# Patient Record
Sex: MALE | Race: WHITE | Employment: FULL TIME | URBAN - METROPOLITAN AREA
[De-identification: names, ages, dates, MRNs, and addresses within clinical notes are randomized per-mention and may not be internally consistent; named-entity substitution may affect disease eponyms.]

---

## 2018-11-15 ENCOUNTER — HOSPITAL ENCOUNTER (OUTPATIENT)
Age: 53
Discharge: HOME | End: 2018-11-15

## 2018-11-15 LAB — RUBV IGG SER QL: 185.8 IU/ML

## 2018-11-15 PROCEDURE — 86735 MUMPS ANTIBODY: CPT

## 2018-11-15 PROCEDURE — 86787 VARICELLA-ZOSTER ANTIBODY: CPT

## 2018-11-15 PROCEDURE — 86765 RUBEOLA ANTIBODY: CPT

## 2018-11-15 PROCEDURE — 86762 RUBELLA ANTIBODY: CPT

## 2018-11-15 PROCEDURE — 86480 TB TEST CELL IMMUN MEASURE: CPT

## 2018-11-16 LAB
MEASLES IMMUNE (IGG): 7.91
MUV IGG SER QL: 8.04
VZV IGG SER QL IA: 0.79

## 2018-11-17 LAB
QUANTI TB GOLD PLUS: NEGATIVE
QUANTI TB1 MINUS NIL: 0 IU/ML (ref 0–0.34)
QUANTI TB2 MINUS NIL: 0 IU/ML (ref 0–0.34)
QUANTIFERON MITOGEN: >10 IU/ML
QUANTIFERON NIL: 0.02 IU/ML

## 2019-05-23 ENCOUNTER — HOSPITAL ENCOUNTER (OUTPATIENT)
Age: 54
Setting detail: SPECIMEN
Discharge: HOME | End: 2019-05-23
Payer: COMMERCIAL

## 2019-05-23 ENCOUNTER — OFFICE VISIT (OUTPATIENT)
Dept: PRIMARY CARE CLINIC | Age: 54
End: 2019-05-23
Payer: COMMERCIAL

## 2019-05-23 VITALS
OXYGEN SATURATION: 98 % | SYSTOLIC BLOOD PRESSURE: 130 MMHG | WEIGHT: 148.2 LBS | DIASTOLIC BLOOD PRESSURE: 74 MMHG | HEART RATE: 50 BPM | HEIGHT: 68 IN | BODY MASS INDEX: 22.46 KG/M2

## 2019-05-23 DIAGNOSIS — Z13.220 ENCOUNTER FOR LIPID SCREENING FOR CARDIOVASCULAR DISEASE: ICD-10-CM

## 2019-05-23 DIAGNOSIS — Z13.1 ENCOUNTER FOR SCREENING EXAMINATION FOR IMPAIRED GLUCOSE REGULATION AND DIABETES MELLITUS: ICD-10-CM

## 2019-05-23 DIAGNOSIS — Z13.6 ENCOUNTER FOR LIPID SCREENING FOR CARDIOVASCULAR DISEASE: ICD-10-CM

## 2019-05-23 DIAGNOSIS — Z23 NEED FOR VIRAL IMMUNIZATION: ICD-10-CM

## 2019-05-23 DIAGNOSIS — Z00.00 ANNUAL PHYSICAL EXAM: Primary | ICD-10-CM

## 2019-05-23 DIAGNOSIS — Z00.00 ANNUAL PHYSICAL EXAM: ICD-10-CM

## 2019-05-23 DIAGNOSIS — Z12.5 SCREENING PSA (PROSTATE SPECIFIC ANTIGEN): ICD-10-CM

## 2019-05-23 LAB
CHOLESTEROL, FASTING: 164 MG/DL
CHOLESTEROL/HDL RATIO: 3.2
GLUCOSE FASTING: 84 MG/DL (ref 70–99)
HDLC SERPL-MCNC: 51 MG/DL
HEPATITIS C ANTIBODY: NONREACTIVE
LDL CHOLESTEROL: 103 MG/DL (ref 0–130)
PROSTATE SPECIFIC ANTIGEN: 0.5 UG/L
TRIGLYCERIDE, FASTING: 51 MG/DL
VLDLC SERPL CALC-MCNC: NORMAL MG/DL (ref 1–30)

## 2019-05-23 PROCEDURE — 90471 IMMUNIZATION ADMIN: CPT | Performed by: FAMILY MEDICINE

## 2019-05-23 PROCEDURE — 99396 PREV VISIT EST AGE 40-64: CPT | Performed by: FAMILY MEDICINE

## 2019-05-23 PROCEDURE — 90715 TDAP VACCINE 7 YRS/> IM: CPT | Performed by: FAMILY MEDICINE

## 2019-05-23 SDOH — HEALTH STABILITY: MENTAL HEALTH: HOW OFTEN DO YOU HAVE A DRINK CONTAINING ALCOHOL?: NEVER

## 2019-05-23 ASSESSMENT — PATIENT HEALTH QUESTIONNAIRE - PHQ9
SUM OF ALL RESPONSES TO PHQ9 QUESTIONS 1 & 2: 0
SUM OF ALL RESPONSES TO PHQ QUESTIONS 1-9: 0
2. FEELING DOWN, DEPRESSED OR HOPELESS: 0
1. LITTLE INTEREST OR PLEASURE IN DOING THINGS: 0
SUM OF ALL RESPONSES TO PHQ QUESTIONS 1-9: 0

## 2019-05-23 ASSESSMENT — ENCOUNTER SYMPTOMS
SHORTNESS OF BREATH: 0
SORE THROAT: 0
EYE DISCHARGE: 0
DIARRHEA: 0
VOMITING: 0
EYE REDNESS: 0
COUGH: 0
RHINORRHEA: 0
NAUSEA: 0
WHEEZING: 0
ABDOMINAL PAIN: 0

## 2019-05-23 NOTE — PROGRESS NOTES
[address] [facility] PRIMARY CARE  [address] [address] [facility]  Dept: [phone]    Mila Stallworth is a 48 y.o. male who presents today for his medical conditions/complaintsas noted below. Chief Complaint   Patient presents with    Annual Exam     Be well within       HPI:     HPI   Pt here for annual exam.  Has some knee pain. No chest pain or sob. No fever or chills. No light headed. No chronic meds. No change in family history. Weight stable. LDL Calculated (mg/dL)   Date Value   04/27/2017 97       (goal LDL is <100)   No results found for: AST, ALT, BUN  BP Readings from Last 3 Encounters:   05/23/19 130/74   04/21/17 138/78          (goal 120/80)    History reviewed. No pertinent past medical history. History reviewed. No pertinent surgical history. History reviewed. No pertinent family history. Social History     Tobacco Use    Smoking status: Never Smoker    Smokeless tobacco: Never Used   Substance Use Topics    Alcohol use: Never     Frequency: Never      No current outpatient medications on file. No current facility-administered medications for this visit. No Known Allergies    Health Maintenance   Topic Date Due    Hepatitis C screen  1965    HIV screen  12/05/1980    DTaP/Tdap/Td vaccine (1 - Tdap) 12/05/1984    Shingles Vaccine (1 of 2) 12/05/2015    Lipid screen  04/27/2022    Colon cancer screen colonoscopy  06/06/2027    Flu vaccine  Completed    Pneumococcal 0-64 years Vaccine  Aged Out       Subjective:      Review of Systems   Constitutional: Negative for chills and fever. HENT: Negative for rhinorrhea and sore throat. Eyes: Negative for discharge and redness. Respiratory: Negative for cough, shortness of breath and wheezing. Cardiovascular: Negative for chest pain and palpitations. Gastrointestinal: Negative for abdominal pain, diarrhea, nausea and vomiting.    Genitourinary: Negative for dysuria and frequency. Musculoskeletal: Negative for arthralgias and myalgias. Neurological: Negative for dizziness, light-headedness and headaches. Psychiatric/Behavioral: Negative for sleep disturbance. Objective:     /74   Pulse 50   Ht 5' 8.04\" (1.728 m)   Wt 148 lb 3.2 oz (67.2 kg)   SpO2 98%   BMI 22.51 kg/m²   Physical Exam   Constitutional: He is oriented to person, place, and time. He appears well-developed and well-nourished. No distress. HENT:   Head: Normocephalic and atraumatic. Mouth/Throat: Oropharynx is clear and moist.   Eyes: Pupils are equal, round, and reactive to light. Conjunctivae are normal. Right eye exhibits no discharge. Left eye exhibits no discharge. No scleral icterus. Neck: No tracheal deviation present. No thyromegaly present. Cardiovascular: Normal rate, regular rhythm and normal heart sounds. No carotid bruits   Pulmonary/Chest: Effort normal and breath sounds normal. No respiratory distress. He has no wheezes. Musculoskeletal: He exhibits no edema. Lymphadenopathy:     He has no cervical adenopathy. Neurological: He is alert and oriented to person, place, and time. Skin: Skin is warm. No rash noted. Psychiatric: He has a normal mood and affect. His behavior is normal. Thought content normal.   Nursing note and vitals reviewed. Assessment:       Diagnosis Orders   1. Annual physical exam  Hepatitis C Antibody   2. Need for viral immunization  Tdap (age 10y-63y) IM (ADACEL)   3. Encounter for lipid screening for cardiovascular disease  Lipid, Fasting   4. Encounter for screening examination for impaired glucose regulation and diabetes mellitus  Glucose, Fasting   5. Screening PSA (prostate specific antigen)  PSA Screening        Plan:     Adacel today  Labs ordered    Return in about 1 year (around 5/23/2020) for PHYSICAL EXAM.    Orders Placed This Encounter   Procedures    Tdap (age 10y-63y) IM (ADACEL)    PSA Screening     Standing Status: Future     Standing Expiration Date:   5/23/2020    Hepatitis C Antibody     Standing Status:   Future     Standing Expiration Date:   5/23/2020    Lipid, Fasting     Standing Status:   Future     Standing Expiration Date:   5/23/2020    Glucose, Fasting     Standing Status:   Future     Standing Expiration Date:   5/23/2020     No orders of the defined types were placed in this encounter. Patient given educationalmaterials - see patient instructions. Discussed use, benefit, and side effectsof prescribed medications. All patient questions answered. Pt voiced understanding. Reviewed health maintenance. Instructed to continue current medications, diet andexercise. Patient agreed with treatment plan. Follow up as directed.      Electronicallysigned by Brooke Flores MD on 5/23/2019 at 2:27 PM

## 2020-05-15 ENCOUNTER — OFFICE VISIT (OUTPATIENT)
Dept: PRIMARY CARE CLINIC | Age: 55
End: 2020-05-15

## 2020-05-15 ENCOUNTER — HOSPITAL ENCOUNTER (OUTPATIENT)
Age: 55
Setting detail: SPECIMEN
Discharge: HOME | End: 2020-05-15
Payer: COMMERCIAL

## 2020-05-18 LAB — SARS-COV-2, NAA: NOT DETECTED

## 2020-05-19 ENCOUNTER — TELEPHONE (OUTPATIENT)
Dept: PRIMARY CARE CLINIC | Age: 55
End: 2020-05-19

## 2020-05-20 ENCOUNTER — OFFICE VISIT (OUTPATIENT)
Dept: PRIMARY CARE CLINIC | Age: 55
End: 2020-05-20

## 2020-05-20 ENCOUNTER — HOSPITAL ENCOUNTER (OUTPATIENT)
Age: 55
Setting detail: SPECIMEN
Discharge: HOME | End: 2020-05-20
Payer: COMMERCIAL

## 2020-05-20 NOTE — PROGRESS NOTES
Patient presents for COVID-19 testing. Order accessible in [address] [place] [address] Rd. Patient swabbed in the office. Patient tolerated well.      Electronically signed by MICHAEL Viramontes CNP on 5/20/2020 at 11:00 AM

## 2020-05-23 LAB — SARS-COV-2, NAA: NOT DETECTED

## 2020-11-11 ENCOUNTER — HOSPITAL ENCOUNTER (OUTPATIENT)
Age: 55
Discharge: HOME | End: 2020-11-11
Payer: COMMERCIAL

## 2020-11-11 PROCEDURE — U0003 INFECTIOUS AGENT DETECTION BY NUCLEIC ACID (DNA OR RNA); SEVERE ACUTE RESPIRATORY SYNDROME CORONAVIRUS 2 (SARS-COV-2) (CORONAVIRUS DISEASE [COVID-19]), AMPLIFIED PROBE TECHNIQUE, MAKING USE OF HIGH THROUGHPUT TECHNOLOGIES AS DESCRIBED BY CMS-2020-01-R: HCPCS

## 2020-11-13 LAB — SARS-COV-2, NAA: DETECTED

## 2020-11-20 ENCOUNTER — TELEMEDICINE (OUTPATIENT)
Dept: PRIMARY CARE CLINIC | Age: 55
End: 2020-11-20
Payer: COMMERCIAL

## 2020-11-20 PROCEDURE — 3017F COLORECTAL CA SCREEN DOC REV: CPT | Performed by: PHYSICIAN ASSISTANT

## 2020-11-20 PROCEDURE — 99213 OFFICE O/P EST LOW 20 MIN: CPT | Performed by: PHYSICIAN ASSISTANT

## 2020-11-20 PROCEDURE — G8427 DOCREV CUR MEDS BY ELIG CLIN: HCPCS | Performed by: PHYSICIAN ASSISTANT

## 2020-11-20 RX ORDER — BENZONATATE 100 MG/1
100 CAPSULE ORAL 3 TIMES DAILY PRN
Qty: 30 CAPSULE | Refills: 0 | Status: SHIPPED | OUTPATIENT
Start: 2020-11-20 | End: 2020-11-30

## 2020-11-20 ASSESSMENT — ENCOUNTER SYMPTOMS
CONSTIPATION: 0
VOMITING: 0
CHEST TIGHTNESS: 0
DIARRHEA: 0
SORE THROAT: 0
ABDOMINAL PAIN: 0
SINUS PRESSURE: 0
COUGH: 1
RHINORRHEA: 0
SHORTNESS OF BREATH: 0
ABDOMINAL DISTENTION: 0

## 2020-11-20 NOTE — PROGRESS NOTES
[address] [facility] CARE  [address] [address]  [address] [address]  Dept: Marie Arnoldo Decker is a 47 y.o. male who presents today for his medical conditions/complaintsas noted below. Chief Complaint   Patient presents with    Other     RTW visit       HPI:     HPI: The patient is here for a work note to return to work. Has lingering cough. No fevers in last 24 hours. No fever lowering medication. The his symptoms stated on the ninth. He had a cough, He has fatigue and felt run down but that is better. He would like to return Monday. He is using OTC robitussin. He has also seen the occupational michael Nurse practitioner. LDL Cholesterol (mg/dL)   Date Value   05/23/2019 103     LDL Calculated (mg/dL)   Date Value   04/27/2017 97       (goal LDL is <100)   No results found for: AST, ALT, BUN  BP Readings from Last 3 Encounters:   05/23/19 130/74   04/21/17 138/78          (goal 120/80)    No past medical history on file. No past surgical history on file. No family history on file. Social History     Tobacco Use    Smoking status: Never Smoker    Smokeless tobacco: Never Used   Substance Use Topics    Alcohol use: Never     Frequency: Never      No current outpatient medications on file. No current facility-administered medications for this visit. No Known Allergies    Health Maintenance   Topic Date Due    HIV screen  12/05/1980    Shingles Vaccine (1 of 2) 12/05/2015    Flu vaccine (1) 09/01/2020    Lipid screen  05/23/2024    Colon cancer screen colonoscopy  06/06/2027    DTaP/Tdap/Td vaccine (3 - Td) 05/23/2029    Hepatitis C screen  Completed    Hepatitis A vaccine  Aged Out    Hepatitis B vaccine  Aged Out    Hib vaccine  Aged Out    Meningococcal (ACWY) vaccine  Aged Out    Pneumococcal 0-64 years Vaccine  Aged Out       Subjective:      Review of Systems   Constitutional: Negative for chills, fatigue and fever.    HENT: Negative for congestion, hearing loss, rhinorrhea, sinus pressure and sore throat. Respiratory: Positive for cough. Negative for chest tightness and shortness of breath. Gastrointestinal: Negative for abdominal distention, abdominal pain, constipation, diarrhea and vomiting. Musculoskeletal: Negative for arthralgias, gait problem and myalgias. Skin: Negative for rash. Neurological: Positive for headaches (at night ). Psychiatric/Behavioral: Negative for dysphoric mood and sleep disturbance. The patient is not nervous/anxious. Objective: There were no vitals taken for this visit. Physical Exam  Constitutional:       General: He is not in acute distress. Appearance: Normal appearance. He is not ill-appearing. HENT:      Head: Normocephalic and atraumatic. Right Ear: External ear normal.      Left Ear: External ear normal.      Nose: Nose normal.      Mouth/Throat:      Mouth: Mucous membranes are moist.   Pulmonary:      Effort: Pulmonary effort is normal. No respiratory distress. Comments: cough  Musculoskeletal: Normal range of motion. Neurological:      Mental Status: He is alert and oriented to person, place, and time. Psychiatric:         Mood and Affect: Mood normal.         Behavior: Behavior normal.         Thought Content: Thought content normal.         Assessment:       Diagnosis Orders   1. Cough          Plan:       Tessalon perrles ordered   He may return to work without restrictions on Monday. No other concerns   Call office if symptoms worsen or fever returns before work    Hodan Cruz is a 47 y.o. male being evaluated by a Virtual Visit (video visit) encounter to address concerns as mentioned above. A caregiver was present when appropriate. Due to this being a TeleHealth encounter (During [place] [address] public health emergency), evaluation of the following organ systems was limited: Vitals/Constitutional/EENT/Resp/CV/GI//MS/Neuro/Skin/Heme-Lymph-Imm. Pursuant to the emergency declaration under the [facility], [facility] authority and the [employer] Act, this Virtual Visit was conducted with patient's (and/or legal guardian's) consent, to reduce the patient's risk of exposure to COVID-19 and provide necessary medical care. The patient (and/or legal guardian) has also been advised to contact this office for worsening conditions or problems, and seek emergency medical treatment and/or call 911 if deemed necessary. Patient identification was verified at the start of the visit: Yes    Total time spent for this encounter: 15 min    Services were provided through a video synchronous discussion virtually to substitute for in-person clinic visit. Patient and provider were located at their individual homes. --LADAN Alvarenga on 11/20/2020 at 7:40 AM    An electronic signature was used to authenticate this note. Return for Follow up if symptoms persist or worsen. No orders of the defined types were placed in this encounter. No orders of the defined types were placed in this encounter. Patient given educationalmaterials - see patient instructions. Discussed use, benefit, and side effectsof prescribed medications. All patient questions answered. Pt voiced understanding. Reviewed health maintenance. Instructed to continue current medications, diet andexercise. Patient agreed with treatment plan. Follow up as directed.      Electronicallysigned by LADAN Alvarenga on 11/20/2020 at 7:39 AM

## 2021-07-30 ENCOUNTER — OFFICE VISIT (OUTPATIENT)
Dept: PRIMARY CARE CLINIC | Age: 56
End: 2021-07-30
Payer: COMMERCIAL

## 2021-07-30 VITALS
OXYGEN SATURATION: 99 % | SYSTOLIC BLOOD PRESSURE: 124 MMHG | HEIGHT: 68 IN | WEIGHT: 144.7 LBS | BODY MASS INDEX: 21.93 KG/M2 | DIASTOLIC BLOOD PRESSURE: 72 MMHG | HEART RATE: 47 BPM

## 2021-07-30 DIAGNOSIS — R53.83 FATIGUE, UNSPECIFIED TYPE: ICD-10-CM

## 2021-07-30 DIAGNOSIS — Z00.00 ANNUAL PHYSICAL EXAM: Primary | ICD-10-CM

## 2021-07-30 DIAGNOSIS — Z12.5 SCREENING PSA (PROSTATE SPECIFIC ANTIGEN): ICD-10-CM

## 2021-07-30 DIAGNOSIS — Z23 NEED FOR VIRAL IMMUNIZATION: ICD-10-CM

## 2021-07-30 DIAGNOSIS — Z13.6 ENCOUNTER FOR LIPID SCREENING FOR CARDIOVASCULAR DISEASE: ICD-10-CM

## 2021-07-30 DIAGNOSIS — Z13.220 ENCOUNTER FOR LIPID SCREENING FOR CARDIOVASCULAR DISEASE: ICD-10-CM

## 2021-07-30 PROCEDURE — 99396 PREV VISIT EST AGE 40-64: CPT | Performed by: FAMILY MEDICINE

## 2021-07-30 PROCEDURE — 90471 IMMUNIZATION ADMIN: CPT | Performed by: FAMILY MEDICINE

## 2021-07-30 PROCEDURE — 90750 HZV VACC RECOMBINANT IM: CPT | Performed by: FAMILY MEDICINE

## 2021-07-30 SDOH — ECONOMIC STABILITY: FOOD INSECURITY: WITHIN THE PAST 12 MONTHS, YOU WORRIED THAT YOUR FOOD WOULD RUN OUT BEFORE YOU GOT MONEY TO BUY MORE.: NEVER TRUE

## 2021-07-30 SDOH — ECONOMIC STABILITY: FOOD INSECURITY: WITHIN THE PAST 12 MONTHS, THE FOOD YOU BOUGHT JUST DIDN'T LAST AND YOU DIDN'T HAVE MONEY TO GET MORE.: NEVER TRUE

## 2021-07-30 ASSESSMENT — ENCOUNTER SYMPTOMS
COUGH: 0
EYE REDNESS: 0
WHEEZING: 0
RHINORRHEA: 0
DIARRHEA: 0
NAUSEA: 0
VOMITING: 0
SHORTNESS OF BREATH: 0
ABDOMINAL PAIN: 0
SORE THROAT: 0
EYE DISCHARGE: 0

## 2021-07-30 ASSESSMENT — PATIENT HEALTH QUESTIONNAIRE - PHQ9
SUM OF ALL RESPONSES TO PHQ9 QUESTIONS 1 & 2: 0
SUM OF ALL RESPONSES TO PHQ QUESTIONS 1-9: 0
2. FEELING DOWN, DEPRESSED OR HOPELESS: 0
SUM OF ALL RESPONSES TO PHQ QUESTIONS 1-9: 0
SUM OF ALL RESPONSES TO PHQ QUESTIONS 1-9: 0
1. LITTLE INTEREST OR PLEASURE IN DOING THINGS: 0

## 2021-07-30 ASSESSMENT — SOCIAL DETERMINANTS OF HEALTH (SDOH): HOW HARD IS IT FOR YOU TO PAY FOR THE VERY BASICS LIKE FOOD, HOUSING, MEDICAL CARE, AND HEATING?: NOT HARD AT ALL

## 2021-07-30 NOTE — PROGRESS NOTES
[address] [address] PRIMARY CARE  [address] [address] [place] [address]  Dept: [phone]    Greig Jeans is a 54 y.o. male Established patient, who presents today for his medical conditions/complaints as noted below. Chief Complaint   Patient presents with    Annual Exam     Be well within       HPI:     HPI  Patient here for well checkup. Denies any chest pain or shortness of breath. No lightheadedness or dizziness. States last time he went to give blood told he was anemic and could not. Patient still running 3 to 10 miles a day. Denies any urinary symptoms. No melena hematochezia. Otherwise unremarkable. Reviewed prior notes None  Reviewed previous Labs    LDL Cholesterol (mg/dL)   Date Value   05/23/2019 103     LDL Calculated (mg/dL)   Date Value   04/27/2017 97       (goal LDL is <100)   No results found for: AST, ALT, BUN, LABA1C, TSH  BP Readings from Last 3 Encounters:   07/30/21 124/72   05/23/19 130/74   04/21/17 138/78          (goal 120/80)    No past medical history on file. No past surgical history on file. No family history on file. Social History     Tobacco Use    Smoking status: Never Smoker    Smokeless tobacco: Never Used   Substance Use Topics    Alcohol use: Never      No current outpatient medications on file. No current facility-administered medications for this visit.      No Known Allergies    Health Maintenance   Topic Date Due    HIV screen  Never done    Flu vaccine (1) 09/01/2021    Shingles Vaccine (2 of 2) 09/24/2021    Lipid screen  05/23/2024    Colon cancer screen colonoscopy  06/06/2027    DTaP/Tdap/Td vaccine (3 - Td or Tdap) 05/23/2029    COVID-19 Vaccine  Completed    Hepatitis C screen  Completed    Hepatitis A vaccine  Aged Out    Hepatitis B vaccine  Aged Out    Hib vaccine  Aged Out    Meningococcal (ACWY) vaccine  Aged Out    Pneumococcal 0-64 years Vaccine  Aged Out       Subjective: Diagnosis Orders   1. Annual physical exam  Basic Metabolic Panel, Fasting    Hepatic Function Panel    CBC With Auto Differential    TSH   2. Fatigue, unspecified type  CBC With Auto Differential    TSH    Ferritin   3. Encounter for lipid screening for cardiovascular disease  Lipid, Fasting   4. Screening PSA (prostate specific antigen)  PSA screening   5. Need for viral immunization  Zoster [facility])        Plan:    labs ordered  Start Shingrix series    Return in about 1 year (around 7/30/2022). Orders Placed This Encounter   Procedures    Zoster [facility])    Lipid, Fasting     Standing Status:   Future     Standing Expiration Date:   10/30/2021    Basic Metabolic Panel, Fasting     Standing Status:   Future     Standing Expiration Date:   10/30/2021    Hepatic Function Panel     Standing Status:   Future     Standing Expiration Date:   10/30/2021    PSA screening     Standing Status:   Future     Standing Expiration Date:   10/30/2021    CBC With Auto Differential     Standing Status:   Future     Standing Expiration Date:   7/30/2022    TSH     Standing Status:   Future     Standing Expiration Date:   7/30/2022    Ferritin     Standing Status:   Future     Standing Expiration Date:   7/30/2022     No orders of the defined types were placed in this encounter. Patient given educational materials - see patient instructions. Discussed use, benefit, and side effects of prescribed medications. All patient questions answered. Pt voiced understanding. Reviewed health maintenance. Instructed to continue current medications, diet andexercise. Patient agreed with treatment plan. Follow up as directed.      Electronicallysigned by Luba Summers MD on 7/30/2021 at 2:56 PM

## 2021-08-05 ENCOUNTER — HOSPITAL ENCOUNTER (OUTPATIENT)
Age: 56
Setting detail: SPECIMEN
Discharge: HOME | End: 2021-08-05
Payer: COMMERCIAL

## 2021-08-05 DIAGNOSIS — Z12.5 SCREENING PSA (PROSTATE SPECIFIC ANTIGEN): ICD-10-CM

## 2021-08-05 DIAGNOSIS — R53.83 FATIGUE, UNSPECIFIED TYPE: ICD-10-CM

## 2021-08-05 DIAGNOSIS — Z13.6 ENCOUNTER FOR LIPID SCREENING FOR CARDIOVASCULAR DISEASE: ICD-10-CM

## 2021-08-05 DIAGNOSIS — Z00.00 ANNUAL PHYSICAL EXAM: ICD-10-CM

## 2021-08-05 DIAGNOSIS — Z13.220 ENCOUNTER FOR LIPID SCREENING FOR CARDIOVASCULAR DISEASE: ICD-10-CM

## 2021-08-05 LAB
ABSOLUTE EOS #: 0.41 K/UL (ref 0–0.44)
ABSOLUTE IMMATURE GRANULOCYTE: <0.03 K/UL (ref 0–0.3)
ABSOLUTE LYMPH #: 1.24 K/UL (ref 1.1–3.7)
ABSOLUTE MONO #: 0.42 K/UL (ref 0.1–1.2)
ALBUMIN SERPL-MCNC: 4.3 G/DL (ref 3.5–5.2)
ALBUMIN/GLOBULIN RATIO: 1.7 (ref 1–2.5)
ALP BLD-CCNC: 73 U/L (ref 40–129)
ALT SERPL-CCNC: 14 U/L (ref 5–41)
ANION GAP SERPL CALCULATED.3IONS-SCNC: 9 MMOL/L (ref 9–17)
AST SERPL-CCNC: 21 U/L
BASOPHILS # BLD: 2 % (ref 0–2)
BASOPHILS ABSOLUTE: 0.07 K/UL (ref 0–0.2)
BILIRUB SERPL-MCNC: 0.6 MG/DL (ref 0.3–1.2)
BILIRUBIN DIRECT: 0.13 MG/DL
BILIRUBIN, INDIRECT: 0.47 MG/DL (ref 0–1)
BUN BLDV-MCNC: 16 MG/DL (ref 6–20)
BUN/CREAT BLD: NORMAL (ref 9–20)
CALCIUM SERPL-MCNC: 9.5 MG/DL (ref 8.6–10.4)
CHLORIDE BLD-SCNC: 103 MMOL/L (ref 98–107)
CHOLESTEROL, FASTING: 172 MG/DL
CHOLESTEROL/HDL RATIO: 3.8
CO2: 26 MMOL/L (ref 20–31)
CREAT SERPL-MCNC: 0.9 MG/DL (ref 0.7–1.2)
DIFFERENTIAL TYPE: ABNORMAL
EOSINOPHILS RELATIVE PERCENT: 9 % (ref 1–4)
FERRITIN: 13 UG/L (ref 30–400)
GFR AFRICAN AMERICAN: >60 ML/MIN
GFR NON-AFRICAN AMERICAN: >60 ML/MIN
GFR SERPL CREATININE-BSD FRML MDRD: NORMAL ML/MIN/{1.73_M2}
GFR SERPL CREATININE-BSD FRML MDRD: NORMAL ML/MIN/{1.73_M2}
GLOBULIN: NORMAL G/DL (ref 1.5–3.8)
GLUCOSE FASTING: 87 MG/DL (ref 70–99)
HCT VFR BLD CALC: 44.2 % (ref 40.7–50.3)
HDLC SERPL-MCNC: 45 MG/DL
HEMOGLOBIN: 12.9 G/DL (ref 13–17)
IMMATURE GRANULOCYTES: 0 %
LDL CHOLESTEROL: 112 MG/DL (ref 0–130)
LYMPHOCYTES # BLD: 27 % (ref 24–43)
MCH RBC QN AUTO: 22.1 PG (ref 25.2–33.5)
MCHC RBC AUTO-ENTMCNC: 29.2 G/DL (ref 28.4–34.8)
MCV RBC AUTO: 75.8 FL (ref 82.6–102.9)
MONOCYTES # BLD: 9 % (ref 3–12)
NRBC AUTOMATED: 0 PER 100 WBC
PDW BLD-RTO: 19.7 % (ref 11.8–14.4)
PLATELET # BLD: 194 K/UL (ref 138–453)
PLATELET ESTIMATE: ABNORMAL
PMV BLD AUTO: 10.8 FL (ref 8.1–13.5)
POTASSIUM SERPL-SCNC: 4.3 MMOL/L (ref 3.7–5.3)
PROSTATE SPECIFIC ANTIGEN: 0.5 UG/L
RBC # BLD: 5.83 M/UL (ref 4.21–5.77)
RBC # BLD: ABNORMAL 10*6/UL
SEG NEUTROPHILS: 53 % (ref 36–65)
SEGMENTED NEUTROPHILS ABSOLUTE COUNT: 2.49 K/UL (ref 1.5–8.1)
SODIUM BLD-SCNC: 138 MMOL/L (ref 135–144)
TOTAL PROTEIN: 6.8 G/DL (ref 6.4–8.3)
TRIGLYCERIDE, FASTING: 76 MG/DL
TSH SERPL DL<=0.05 MIU/L-ACNC: 1.99 MIU/L (ref 0.3–5)
VLDLC SERPL CALC-MCNC: NORMAL MG/DL (ref 1–30)
WBC # BLD: 4.7 K/UL (ref 3.5–11.3)
WBC # BLD: ABNORMAL 10*3/UL

## 2021-08-12 ENCOUNTER — TELEPHONE (OUTPATIENT)
Dept: PRIMARY CARE CLINIC | Age: 56
End: 2021-08-12

## 2021-08-12 NOTE — RESULT ENCOUNTER NOTE
Advise pt shows iron deficiency. Recommend starting ferrous sulfate 325mg daily  Please do stool hemoccult times two. Please give him two hemoccult cards.

## 2021-08-30 DIAGNOSIS — Z00.00 ANNUAL PHYSICAL EXAM: Primary | ICD-10-CM

## 2021-08-30 DIAGNOSIS — Z00.00 ANNUAL PHYSICAL EXAM: ICD-10-CM

## 2021-08-30 LAB
HEMOCCULT STL QL: NEGATIVE

## 2021-08-30 PROCEDURE — 82270 OCCULT BLOOD FECES: CPT | Performed by: FAMILY MEDICINE

## 2021-09-22 LAB
CHOLESTEROL/HDL RATIO: 3.8
CHOLESTEROL: 172 MG/DL
GLUCOSE BLD-MCNC: 87 MG/DL (ref 70–99)
HDLC SERPL-MCNC: 45 MG/DL
LDL CHOLESTEROL: 112 MG/DL (ref 0–130)
PATIENT FASTING?: NORMAL
TRIGL SERPL-MCNC: 76 MG/DL
VLDLC SERPL CALC-MCNC: NORMAL MG/DL (ref 1–30)

## 2021-11-12 ENCOUNTER — IMMUNIZATION (OUTPATIENT)
Dept: PRIMARY CARE CLINIC | Age: 56
End: 2021-11-12
Payer: COMMERCIAL

## 2021-11-12 DIAGNOSIS — Z23 NEED FOR VACCINATION: Primary | ICD-10-CM

## 2021-11-12 PROCEDURE — 91300 COVID-19, PFIZER VACCINE 30MCG/0.3ML DOSE: CPT | Performed by: PHYSICIAN ASSISTANT

## 2021-11-12 PROCEDURE — 0004A COVID-19, PFIZER VACCINE 30MCG/0.3ML DOSE: CPT | Performed by: PHYSICIAN ASSISTANT

## 2021-11-12 NOTE — PROGRESS NOTES
After obtaining consent, and per orders of Melissa Bailey, injection of covid booster given in Left deltoid by Elizabeth Stauffer MA. Patient instructed to remain in clinic for 15 minutes afterwards, and to report any adverse reaction to me immediately. Patient reported no reactions after 15 minutes of waiting and tolerated well.

## 2021-11-18 ENCOUNTER — NURSE ONLY (OUTPATIENT)
Dept: PRIMARY CARE CLINIC | Age: 56
End: 2021-11-18
Payer: COMMERCIAL

## 2021-11-18 DIAGNOSIS — Z23 NEED FOR VACCINATION: Primary | ICD-10-CM

## 2021-11-18 PROCEDURE — 90471 IMMUNIZATION ADMIN: CPT | Performed by: FAMILY MEDICINE

## 2021-11-18 PROCEDURE — 90750 HZV VACC RECOMBINANT IM: CPT | Performed by: FAMILY MEDICINE

## 2021-11-18 NOTE — PROGRESS NOTES
After obtaining consent, and per orders of Dr. Mark Madrigal, injection of shingrix given in Right deltoid by Wanda Patterson MA. Patient instructed to remain in clinic for 20 minutes afterwards, and to report any adverse reaction to me immediately.

## 2022-01-07 ENCOUNTER — HOSPITAL ENCOUNTER (OUTPATIENT)
Age: 57
Setting detail: SPECIMEN
Discharge: HOME | End: 2022-01-07

## 2022-01-07 DIAGNOSIS — R05.9 COUGH: ICD-10-CM

## 2022-01-07 DIAGNOSIS — R09.81 HEAD CONGESTION: ICD-10-CM

## 2022-01-07 DIAGNOSIS — R09.81 HEAD CONGESTION: Primary | ICD-10-CM

## 2022-01-08 LAB
SARS-COV-2: ABNORMAL
SARS-COV-2: DETECTED
SOURCE: ABNORMAL

## 2022-07-08 ENCOUNTER — OFFICE VISIT (OUTPATIENT)
Dept: PRIMARY CARE CLINIC | Age: 57
End: 2022-07-08
Payer: COMMERCIAL

## 2022-07-08 VITALS
DIASTOLIC BLOOD PRESSURE: 72 MMHG | BODY MASS INDEX: 22.68 KG/M2 | WEIGHT: 144.5 LBS | HEIGHT: 67 IN | HEART RATE: 50 BPM | SYSTOLIC BLOOD PRESSURE: 116 MMHG | OXYGEN SATURATION: 96 %

## 2022-07-08 DIAGNOSIS — Z13.6 ENCOUNTER FOR LIPID SCREENING FOR CARDIOVASCULAR DISEASE: ICD-10-CM

## 2022-07-08 DIAGNOSIS — Z13.220 ENCOUNTER FOR LIPID SCREENING FOR CARDIOVASCULAR DISEASE: ICD-10-CM

## 2022-07-08 DIAGNOSIS — Z00.00 ANNUAL PHYSICAL EXAM: Primary | ICD-10-CM

## 2022-07-08 DIAGNOSIS — Z12.5 SCREENING PSA (PROSTATE SPECIFIC ANTIGEN): ICD-10-CM

## 2022-07-08 DIAGNOSIS — Z00.00 ROUTINE GENERAL MEDICAL EXAMINATION AT A HEALTH CARE FACILITY: ICD-10-CM

## 2022-07-08 PROCEDURE — 99396 PREV VISIT EST AGE 40-64: CPT | Performed by: FAMILY MEDICINE

## 2022-07-08 ASSESSMENT — PATIENT HEALTH QUESTIONNAIRE - PHQ9
1. LITTLE INTEREST OR PLEASURE IN DOING THINGS: 0
3. TROUBLE FALLING OR STAYING ASLEEP: 0
6. FEELING BAD ABOUT YOURSELF - OR THAT YOU ARE A FAILURE OR HAVE LET YOURSELF OR YOUR FAMILY DOWN: 0
SUM OF ALL RESPONSES TO PHQ9 QUESTIONS 1 & 2: 0
10. IF YOU CHECKED OFF ANY PROBLEMS, HOW DIFFICULT HAVE THESE PROBLEMS MADE IT FOR YOU TO DO YOUR WORK, TAKE CARE OF THINGS AT HOME, OR GET ALONG WITH OTHER PEOPLE: 0
8. MOVING OR SPEAKING SO SLOWLY THAT OTHER PEOPLE COULD HAVE NOTICED. OR THE OPPOSITE, BEING SO FIGETY OR RESTLESS THAT YOU HAVE BEEN MOVING AROUND A LOT MORE THAN USUAL: 0
7. TROUBLE CONCENTRATING ON THINGS, SUCH AS READING THE NEWSPAPER OR WATCHING TELEVISION: 0
9. THOUGHTS THAT YOU WOULD BE BETTER OFF DEAD, OR OF HURTING YOURSELF: 0
4. FEELING TIRED OR HAVING LITTLE ENERGY: 0
SUM OF ALL RESPONSES TO PHQ QUESTIONS 1-9: 0
SUM OF ALL RESPONSES TO PHQ QUESTIONS 1-9: 0
5. POOR APPETITE OR OVEREATING: 0
2. FEELING DOWN, DEPRESSED OR HOPELESS: 0
SUM OF ALL RESPONSES TO PHQ QUESTIONS 1-9: 0
SUM OF ALL RESPONSES TO PHQ QUESTIONS 1-9: 0

## 2022-07-08 ASSESSMENT — ENCOUNTER SYMPTOMS
RHINORRHEA: 0
SHORTNESS OF BREATH: 0
SORE THROAT: 0
VOMITING: 0
EYE DISCHARGE: 0
ABDOMINAL PAIN: 0
WHEEZING: 0
NAUSEA: 0
DIARRHEA: 0
EYE REDNESS: 0
COUGH: 0

## 2022-07-08 NOTE — PROGRESS NOTES
[address] [address] PRIMARY CARE  [address] [address] [place] [address]  Dept: [phone]    Ray Anthony is a 64 y.o. male Established patient, who presents today for his medical conditions/complaints as noted below. Chief Complaint   Patient presents with    Annual Exam       HPI:     HPI  Here for annual physical.  Denies any complaints at this time. No chest pain or shortness of breath. No lightheadedness or dizziness. Patient with a 5 mm lesion on his back. States has somewhat enlarged. Reviewed prior notes None  Reviewed previous Labs    LDL Cholesterol (mg/dL)   Date Value   08/05/2021 112   08/05/2021 112   05/23/2019 103     LDL Calculated (mg/dL)   Date Value   04/27/2017 97       (goal LDL is <100)   AST (U/L)   Date Value   08/05/2021 21     ALT (U/L)   Date Value   08/05/2021 14     BUN (mg/dL)   Date Value   08/05/2021 16     TSH (mIU/L)   Date Value   08/05/2021 1.99     BP Readings from Last 3 Encounters:   07/08/22 116/72   07/30/21 124/72   05/23/19 130/74          (goal 120/80)    No past medical history on file. No past surgical history on file. No family history on file. Social History     Tobacco Use    Smoking status: Never Smoker    Smokeless tobacco: Never Used   Substance Use Topics    Alcohol use: Never      No current outpatient medications on file. No current facility-administered medications for this visit.      No Known Allergies    Health Maintenance   Topic Date Due    HIV screen  Never done    Depression Screen  07/30/2022    Prostate Specific Antigen (PSA) Screening or Monitoring  08/05/2022    Flu vaccine (1) 09/01/2022    Lipids  08/05/2026    Colorectal Cancer Screen  06/06/2027    DTaP/Tdap/Td vaccine (3 - Td or Tdap) 05/23/2029    Shingles vaccine  Completed    COVID-19 Vaccine  Completed    Hepatitis C screen  Completed    Hepatitis A vaccine  Aged Out    Hepatitis B vaccine  Aged Out    Hib vaccine Aged Out    Meningococcal (ACWY) vaccine  Aged Out    Pneumococcal 0-64 years Vaccine  Aged Out       Subjective:      Review of Systems   Constitutional: Negative for chills and fever. HENT: Negative for rhinorrhea and sore throat. Eyes: Negative for discharge and redness. Respiratory: Negative for cough, shortness of breath and wheezing. Cardiovascular: Negative for chest pain and palpitations. Gastrointestinal: Negative for abdominal pain, diarrhea, nausea and vomiting. Genitourinary: Negative for dysuria and frequency. Musculoskeletal: Negative for arthralgias and myalgias. Neurological: Negative for dizziness, light-headedness and headaches. Psychiatric/Behavioral: Negative for sleep disturbance. Objective:     /72   Pulse 50   Ht 5' 7.25\" (1.708 m)   Wt 144 lb 8 oz (65.5 kg)   SpO2 96%   BMI 22.46 kg/m²   Physical Exam  Vitals and nursing note reviewed. Constitutional:       General: He is not in acute distress. Appearance: He is well-developed. He is not ill-appearing. HENT:      Head: Normocephalic and atraumatic. Right Ear: External ear normal.      Left Ear: External ear normal.   Eyes:      General: No scleral icterus. Right eye: No discharge. Left eye: No discharge. Conjunctiva/sclera: Conjunctivae normal.   Neck:      Thyroid: No thyromegaly. Trachea: No tracheal deviation. Cardiovascular:      Rate and Rhythm: Normal rate and regular rhythm. Heart sounds: Normal heart sounds. Pulmonary:      Effort: Pulmonary effort is normal. No respiratory distress. Breath sounds: Normal breath sounds. No wheezing. Lymphadenopathy:      Cervical: No cervical adenopathy. Skin:     General: Skin is warm. Findings: No rash. Comments: 5 mm lesion noted on the back. Has a somewhat raised edge almost pearly appearance to it. No ulceration.    Neurological:      Mental Status: He is alert and oriented to person, place, and time. Psychiatric:         Mood and Affect: Mood normal.         Behavior: Behavior normal.         Thought Content: Thought content normal.         Assessment:       Diagnosis Orders   1. Annual physical exam     2. Screening PSA (prostate specific antigen)     3. Encounter for lipid screening for cardiovascular disease          Plan:    Blood work ordered  Schedule for 6 mm punch biopsy of lesion on back. Return in about 1 year (around 7/8/2023). No orders of the defined types were placed in this encounter. No orders of the defined types were placed in this encounter. Patient given educational materials - see patient instructions. Discussed use, benefit, and side effects of prescribed medications. All patient questions answered. Pt voiced understanding. Reviewed health maintenance. Instructed to continue current medications, diet andexercise. Patient agreed with treatment plan. Follow up as directed.      Electronicallysigned by Emily Foote MD on 7/8/2022 at 8:48 AM

## 2022-07-21 DIAGNOSIS — Z00.00 ROUTINE GENERAL MEDICAL EXAMINATION AT A HEALTH CARE FACILITY: ICD-10-CM

## 2022-07-21 LAB
CHOLESTEROL/HDL RATIO: 3.7
CHOLESTEROL: 189 MG/DL
GLUCOSE BLD-MCNC: 69 MG/DL (ref 70–99)
HDLC SERPL-MCNC: 51 MG/DL
LDL CHOLESTEROL: 120 MG/DL (ref 0–130)
PATIENT FASTING?: YES
PROSTATE SPECIFIC ANTIGEN: 0.64 NG/ML
TRIGL SERPL-MCNC: 90 MG/DL

## 2022-09-09 ENCOUNTER — PROCEDURE VISIT (OUTPATIENT)
Dept: PRIMARY CARE CLINIC | Age: 57
End: 2022-09-09
Payer: COMMERCIAL

## 2022-09-09 ENCOUNTER — HOSPITAL ENCOUNTER (OUTPATIENT)
Age: 57
Setting detail: SPECIMEN
Discharge: HOME | End: 2022-09-09

## 2022-09-09 VITALS
BODY MASS INDEX: 22.15 KG/M2 | WEIGHT: 141.1 LBS | SYSTOLIC BLOOD PRESSURE: 122 MMHG | HEIGHT: 67 IN | OXYGEN SATURATION: 98 % | HEART RATE: 50 BPM | DIASTOLIC BLOOD PRESSURE: 64 MMHG

## 2022-09-09 DIAGNOSIS — L81.9 HYPERPIGMENTED SKIN LESION: Primary | ICD-10-CM

## 2022-09-09 PROCEDURE — 11104 PUNCH BX SKIN SINGLE LESION: CPT | Performed by: FAMILY MEDICINE

## 2022-09-09 SDOH — ECONOMIC STABILITY: FOOD INSECURITY: WITHIN THE PAST 12 MONTHS, YOU WORRIED THAT YOUR FOOD WOULD RUN OUT BEFORE YOU GOT MONEY TO BUY MORE.: NEVER TRUE

## 2022-09-09 SDOH — ECONOMIC STABILITY: FOOD INSECURITY: WITHIN THE PAST 12 MONTHS, THE FOOD YOU BOUGHT JUST DIDN'T LAST AND YOU DIDN'T HAVE MONEY TO GET MORE.: NEVER TRUE

## 2022-09-09 ASSESSMENT — SOCIAL DETERMINANTS OF HEALTH (SDOH): HOW HARD IS IT FOR YOU TO PAY FOR THE VERY BASICS LIKE FOOD, HOUSING, MEDICAL CARE, AND HEATING?: NOT HARD AT ALL

## 2022-09-09 NOTE — PROGRESS NOTES
Patient with changing hyperpigmented lesion on his lower back. Has been changing in color and border. 4 mm hyperpigmented lesion with irregular border was identified. Lesion was anesthetized with 2% lidocaine with epinephrine. Using a 5 mm punch, the lesion was removed in total.  Wound was reapproximated using 4-0 nylon suture. Patient tolerated procedure well without complication    Hyperpigmented changing lesion 4 mm lower back    Removal as above. Lesion sent for pathology.   Suture removal in 7 to 10 days

## 2022-09-12 ENCOUNTER — TELEPHONE (OUTPATIENT)
Dept: PRIMARY CARE CLINIC | Age: 57
End: 2022-09-12

## 2022-09-13 LAB — DERMATOLOGY PATHOLOGY REPORT: NORMAL

## 2022-09-16 ENCOUNTER — NURSE ONLY (OUTPATIENT)
Dept: PRIMARY CARE CLINIC | Age: 57
End: 2022-09-16

## 2022-09-16 VITALS — DIASTOLIC BLOOD PRESSURE: 62 MMHG | OXYGEN SATURATION: 98 % | HEART RATE: 56 BPM | SYSTOLIC BLOOD PRESSURE: 112 MMHG

## 2022-09-16 DIAGNOSIS — Z48.02 VISIT FOR SUTURE REMOVAL: Primary | ICD-10-CM

## 2022-09-16 NOTE — PROGRESS NOTES
Patient presents for suture removal. The wound is well healed without signs of infection. The 2 sutures are removed. Wound care and activity instructions given. Return prn.

## 2022-10-20 ENCOUNTER — IMMUNIZATION (OUTPATIENT)
Dept: PRIMARY CARE CLINIC | Age: 57
End: 2022-10-20
Payer: COMMERCIAL

## 2022-10-20 DIAGNOSIS — Z23 NEED FOR VACCINATION: Primary | ICD-10-CM

## 2022-10-20 PROCEDURE — 91312 COVID-19, PFIZER BIVALENT BOOSTER, (AGE 12Y+), IM, 30 MCG/0.3 ML: CPT | Performed by: FAMILY MEDICINE

## 2022-10-20 NOTE — PROGRESS NOTES
After obtaining consent, and per orders of Dr. Sanaz Saenz, injection of Meléndez Peter bivalent given in Left deltoid by Caryn Eason. Patient instructed to remain in clinic for 15 minutes afterwards, and to report any adverse reaction to me immediately.

## 2023-08-17 DIAGNOSIS — Z00.00 ANNUAL PHYSICAL EXAM: ICD-10-CM

## 2023-08-17 DIAGNOSIS — L40.50 PSA (PSORIATIC ARTHRITIS) (HCC): ICD-10-CM

## 2023-08-17 DIAGNOSIS — Z12.5 SCREENING PSA (PROSTATE SPECIFIC ANTIGEN): ICD-10-CM

## 2023-08-17 DIAGNOSIS — Z78.9 NORMAL TISSUE: ICD-10-CM

## 2023-08-17 DIAGNOSIS — Z00.00 ANNUAL PHYSICAL EXAM: Primary | ICD-10-CM

## 2023-08-17 DIAGNOSIS — Z00.00 ROUTINE PHYSICAL EXAMINATION: ICD-10-CM

## 2023-08-17 LAB
CHOLESTEROL/HDL RATIO: 3.5
CHOLESTEROL: 185 MG/DL
GLUCOSE BLD-MCNC: 101 MG/DL (ref 70–99)
HDLC SERPL-MCNC: 53 MG/DL
LDL CHOLESTEROL: 116 MG/DL (ref 0–130)
PATIENT FASTING?: YES
PROSTATE SPECIFIC ANTIGEN: 0.61 NG/ML
TRIGL SERPL-MCNC: 82 MG/DL

## 2023-09-01 ENCOUNTER — OFFICE VISIT (OUTPATIENT)
Dept: PRIMARY CARE CLINIC | Age: 58
End: 2023-09-01

## 2023-09-01 VITALS
HEIGHT: 67 IN | SYSTOLIC BLOOD PRESSURE: 118 MMHG | HEART RATE: 51 BPM | DIASTOLIC BLOOD PRESSURE: 70 MMHG | WEIGHT: 146.2 LBS | BODY MASS INDEX: 22.95 KG/M2

## 2023-09-01 DIAGNOSIS — Z00.00 ANNUAL PHYSICAL EXAM: Primary | ICD-10-CM

## 2023-09-01 SDOH — ECONOMIC STABILITY: FOOD INSECURITY: WITHIN THE PAST 12 MONTHS, THE FOOD YOU BOUGHT JUST DIDN'T LAST AND YOU DIDN'T HAVE MONEY TO GET MORE.: NEVER TRUE

## 2023-09-01 SDOH — ECONOMIC STABILITY: FOOD INSECURITY: WITHIN THE PAST 12 MONTHS, YOU WORRIED THAT YOUR FOOD WOULD RUN OUT BEFORE YOU GOT MONEY TO BUY MORE.: NEVER TRUE

## 2023-09-01 SDOH — ECONOMIC STABILITY: HOUSING INSECURITY
IN THE LAST 12 MONTHS, WAS THERE A TIME WHEN YOU DID NOT HAVE A STEADY PLACE TO SLEEP OR SLEPT IN A SHELTER (INCLUDING NOW)?: NO

## 2023-09-01 SDOH — ECONOMIC STABILITY: INCOME INSECURITY: HOW HARD IS IT FOR YOU TO PAY FOR THE VERY BASICS LIKE FOOD, HOUSING, MEDICAL CARE, AND HEATING?: NOT HARD AT ALL

## 2023-09-01 ASSESSMENT — PATIENT HEALTH QUESTIONNAIRE - PHQ9
2. FEELING DOWN, DEPRESSED OR HOPELESS: 0
SUM OF ALL RESPONSES TO PHQ QUESTIONS 1-9: 0
SUM OF ALL RESPONSES TO PHQ QUESTIONS 1-9: 0
1. LITTLE INTEREST OR PLEASURE IN DOING THINGS: 0
SUM OF ALL RESPONSES TO PHQ QUESTIONS 1-9: 0
SUM OF ALL RESPONSES TO PHQ9 QUESTIONS 1 & 2: 0
SUM OF ALL RESPONSES TO PHQ QUESTIONS 1-9: 0

## 2023-09-01 ASSESSMENT — ENCOUNTER SYMPTOMS
WHEEZING: 0
EYE REDNESS: 0
NAUSEA: 0
SORE THROAT: 0
DIARRHEA: 0
SHORTNESS OF BREATH: 0
EYE DISCHARGE: 0
COUGH: 0
RHINORRHEA: 0
ABDOMINAL PAIN: 0
VOMITING: 0

## 2024-09-13 ENCOUNTER — OFFICE VISIT (OUTPATIENT)
Dept: PRIMARY CARE CLINIC | Age: 59
End: 2024-09-13
Payer: COMMERCIAL

## 2024-09-13 ENCOUNTER — HOSPITAL ENCOUNTER (OUTPATIENT)
Age: 59
Discharge: HOME | End: 2024-09-13
Payer: COMMERCIAL

## 2024-09-13 VITALS
HEART RATE: 58 BPM | SYSTOLIC BLOOD PRESSURE: 116 MMHG | DIASTOLIC BLOOD PRESSURE: 70 MMHG | HEIGHT: 67 IN | BODY MASS INDEX: 21.85 KG/M2 | WEIGHT: 139.2 LBS

## 2024-09-13 DIAGNOSIS — Z12.5 SCREENING PSA (PROSTATE SPECIFIC ANTIGEN): ICD-10-CM

## 2024-09-13 DIAGNOSIS — Z00.00 ANNUAL PHYSICAL EXAM: ICD-10-CM

## 2024-09-13 DIAGNOSIS — Z00.00 ANNUAL PHYSICAL EXAM: Primary | ICD-10-CM

## 2024-09-13 LAB
CHOLEST SERPL-MCNC: 159 MG/DL (ref 0–199)
CHOLESTEROL/HDL RATIO: 3
GLUCOSE SERPL-MCNC: 77 MG/DL (ref 74–99)
HDLC SERPL-MCNC: 49 MG/DL
LDLC SERPL CALC-MCNC: 100 MG/DL (ref 0–100)
PSA SERPL-MCNC: 0.7 NG/ML (ref 0–4)
TRIGL SERPL-MCNC: 52 MG/DL
VLDLC SERPL CALC-MCNC: 10 MG/DL

## 2024-09-13 PROCEDURE — G0103 PSA SCREENING: HCPCS

## 2024-09-13 PROCEDURE — 99396 PREV VISIT EST AGE 40-64: CPT | Performed by: FAMILY MEDICINE

## 2024-09-13 PROCEDURE — 80061 LIPID PANEL: CPT

## 2024-09-13 PROCEDURE — 82947 ASSAY GLUCOSE BLOOD QUANT: CPT

## 2024-09-13 PROCEDURE — 36415 COLL VENOUS BLD VENIPUNCTURE: CPT

## 2024-09-13 SDOH — ECONOMIC STABILITY: INCOME INSECURITY: HOW HARD IS IT FOR YOU TO PAY FOR THE VERY BASICS LIKE FOOD, HOUSING, MEDICAL CARE, AND HEATING?: NOT HARD AT ALL

## 2024-09-13 SDOH — ECONOMIC STABILITY: FOOD INSECURITY: WITHIN THE PAST 12 MONTHS, THE FOOD YOU BOUGHT JUST DIDN'T LAST AND YOU DIDN'T HAVE MONEY TO GET MORE.: NEVER TRUE

## 2024-09-13 SDOH — ECONOMIC STABILITY: FOOD INSECURITY: WITHIN THE PAST 12 MONTHS, YOU WORRIED THAT YOUR FOOD WOULD RUN OUT BEFORE YOU GOT MONEY TO BUY MORE.: NEVER TRUE

## 2024-09-13 ASSESSMENT — ENCOUNTER SYMPTOMS
VOMITING: 0
DIARRHEA: 0
ABDOMINAL PAIN: 0
NAUSEA: 0
SHORTNESS OF BREATH: 0
WHEEZING: 0
SORE THROAT: 0
RHINORRHEA: 0
EYE DISCHARGE: 0
COUGH: 0
EYE REDNESS: 0

## 2024-09-13 ASSESSMENT — PATIENT HEALTH QUESTIONNAIRE - PHQ9
SUM OF ALL RESPONSES TO PHQ QUESTIONS 1-9: 0
1. LITTLE INTEREST OR PLEASURE IN DOING THINGS: NOT AT ALL
DEPRESSION UNABLE TO ASSESS: FUNCTIONAL CAPACITY MOTIVATION LIMITS ACCURACY
SUM OF ALL RESPONSES TO PHQ QUESTIONS 1-9: 0
SUM OF ALL RESPONSES TO PHQ QUESTIONS 1-9: 0
SUM OF ALL RESPONSES TO PHQ9 QUESTIONS 1 & 2: 0
2. FEELING DOWN, DEPRESSED OR HOPELESS: NOT AT ALL
SUM OF ALL RESPONSES TO PHQ QUESTIONS 1-9: 0

## 2025-02-12 ENCOUNTER — TELEPHONE (OUTPATIENT)
Dept: PRIMARY CARE CLINIC | Age: 60
End: 2025-02-12

## 2025-02-12 NOTE — TELEPHONE ENCOUNTER
FMLA papers signed by Dr Ricks & ready for pickup.  Papers sent to scanning & placed in pickup box at  in [address].    .LEFT MESSAGE FOR PATIENT TO RETURN CALL.     Patient reports mole on his back, would like to know if he needs a consultation in office first or if he can be scheduled for mole removal.    Please advise.

## 2025-03-14 ENCOUNTER — OFFICE VISIT (OUTPATIENT)
Dept: PRIMARY CARE CLINIC | Age: 60
End: 2025-03-14
Payer: COMMERCIAL

## 2025-03-14 VITALS
HEIGHT: 67 IN | BODY MASS INDEX: 22.76 KG/M2 | HEART RATE: 49 BPM | WEIGHT: 145 LBS | DIASTOLIC BLOOD PRESSURE: 64 MMHG | SYSTOLIC BLOOD PRESSURE: 114 MMHG | OXYGEN SATURATION: 98 %

## 2025-03-14 DIAGNOSIS — Z00.00 ANNUAL PHYSICAL EXAM: Primary | ICD-10-CM

## 2025-03-14 DIAGNOSIS — Z00.00 ENCOUNTER FOR WELL ADULT EXAM WITHOUT ABNORMAL FINDINGS: ICD-10-CM

## 2025-03-14 DIAGNOSIS — Z12.5 SCREENING PSA (PROSTATE SPECIFIC ANTIGEN): ICD-10-CM

## 2025-03-14 PROCEDURE — 99396 PREV VISIT EST AGE 40-64: CPT | Performed by: FAMILY MEDICINE

## 2025-03-14 SDOH — ECONOMIC STABILITY: FOOD INSECURITY: WITHIN THE PAST 12 MONTHS, YOU WORRIED THAT YOUR FOOD WOULD RUN OUT BEFORE YOU GOT MONEY TO BUY MORE.: NEVER TRUE

## 2025-03-14 SDOH — ECONOMIC STABILITY: FOOD INSECURITY: WITHIN THE PAST 12 MONTHS, THE FOOD YOU BOUGHT JUST DIDN'T LAST AND YOU DIDN'T HAVE MONEY TO GET MORE.: NEVER TRUE

## 2025-03-14 ASSESSMENT — ENCOUNTER SYMPTOMS
DIARRHEA: 0
COUGH: 0
WHEEZING: 0
RHINORRHEA: 0
ABDOMINAL PAIN: 0
EYE REDNESS: 0
SORE THROAT: 0
EYE DISCHARGE: 0
SHORTNESS OF BREATH: 0
NAUSEA: 0
VOMITING: 0

## 2025-03-14 ASSESSMENT — PATIENT HEALTH QUESTIONNAIRE - PHQ9
SUM OF ALL RESPONSES TO PHQ QUESTIONS 1-9: 0
2. FEELING DOWN, DEPRESSED OR HOPELESS: NOT AT ALL
SUM OF ALL RESPONSES TO PHQ QUESTIONS 1-9: 0
1. LITTLE INTEREST OR PLEASURE IN DOING THINGS: NOT AT ALL
SUM OF ALL RESPONSES TO PHQ QUESTIONS 1-9: 0
SUM OF ALL RESPONSES TO PHQ QUESTIONS 1-9: 0

## 2025-03-14 NOTE — PROGRESS NOTES
[facility]  [facility] PRIMARY CARE  [address] [facility] [address]  Dept: [phone]    Nathaniel Lagunas is a 59 y.o. male Established patient, who presents today for his medical conditions/complaints as noted below.      Chief Complaint   Patient presents with    Annual Exam     BeWell Physical    Skin Problem     Mole Removal       HPI:     HPI  Patient is here for annual exam.  Denies any complaints at this time.  No chest pain or shortness of breath.  Denies any rapid beating of the heart.  States still running about 50 miles per week.  No lightheadedness or dizziness.  No change in weight.  Patient with skin lesion on his back.  Denies any itching or bleeding.  Reviewed prior notes None  Reviewed previous Labs    No components found for: \"LDLCHOLESTEROL\", \"LDLCALC\"    (goal LDL is <100)   AST (U/L)   Date Value   08/05/2021 21     ALT (U/L)   Date Value   08/05/2021 14     BUN (mg/dL)   Date Value   08/05/2021 16     TSH (mIU/L)   Date Value   08/05/2021 1.99     BP Readings from Last 3 Encounters:   09/13/24 116/70   09/01/23 118/70   09/16/22 112/62          (goal 120/80)    No past medical history on file.   No past surgical history on file.    Family History   Problem Relation Age of Onset    Coronary Art Dis Mother     Cancer Father         sinus    Accidental death Brother        Social History     Tobacco Use    Smoking status: Never    Smokeless tobacco: Never   Substance Use Topics    Alcohol use: Never      No current outpatient medications on file.     No current facility-administered medications for this visit.     No Known Allergies    Health Maintenance   Topic Date Due    HIV screen  Never done    Hepatitis B vaccine (1 of 3 - 19+ 3-dose series) Never done    Pneumococcal 50+ years Vaccine (2 of 2 - PPSV23) 04/21/2018    Flu vaccine (1) 08/01/2024    COVID-19 Vaccine (5 - 2024-25 season) 09/01/2024    Depression Screen  09/13/2025    Colorectal Cancer Screen

## 2025-04-30 DIAGNOSIS — Z12.5 SCREENING PSA (PROSTATE SPECIFIC ANTIGEN): ICD-10-CM

## 2025-04-30 DIAGNOSIS — Z00.00 ANNUAL PHYSICAL EXAM: ICD-10-CM

## 2025-04-30 LAB
CHOLESTEROL, TOTAL: 181 MG/DL (ref 0–199)
CHOLESTEROL/HDL RATIO: 3.3
GLUCOSE BLD-MCNC: 90 MG/DL (ref 74–99)
HDLC SERPL-MCNC: 55 MG/DL
LDL CHOLESTEROL: 112 MG/DL (ref 0–100)
PATIENT FASTING?: ABNORMAL
PROSTATE SPECIFIC ANTIGEN: 0.85 NG/ML (ref 0–4)
TRIGL SERPL-MCNC: 69 MG/DL
VLDLC SERPL CALC-MCNC: 14 MG/DL (ref 1–30)

## 2025-05-01 ENCOUNTER — RESULTS FOLLOW-UP (OUTPATIENT)
Dept: PRIMARY CARE CLINIC | Age: 60
End: 2025-05-01